# Patient Record
Sex: FEMALE | Race: OTHER | HISPANIC OR LATINO | Employment: FULL TIME | URBAN - METROPOLITAN AREA
[De-identification: names, ages, dates, MRNs, and addresses within clinical notes are randomized per-mention and may not be internally consistent; named-entity substitution may affect disease eponyms.]

---

## 2020-01-22 ENCOUNTER — OFFICE VISIT (OUTPATIENT)
Dept: GASTROENTEROLOGY | Facility: CLINIC | Age: 60
End: 2020-01-22
Payer: COMMERCIAL

## 2020-01-22 VITALS
DIASTOLIC BLOOD PRESSURE: 70 MMHG | WEIGHT: 152 LBS | SYSTOLIC BLOOD PRESSURE: 110 MMHG | BODY MASS INDEX: 27.97 KG/M2 | HEART RATE: 78 BPM | HEIGHT: 62 IN

## 2020-01-22 DIAGNOSIS — Z12.11 COLON CANCER SCREENING: ICD-10-CM

## 2020-01-22 DIAGNOSIS — K21.9 GASTROESOPHAGEAL REFLUX DISEASE, ESOPHAGITIS PRESENCE NOT SPECIFIED: Primary | ICD-10-CM

## 2020-01-22 DIAGNOSIS — Z12.11 SCREENING FOR COLON CANCER: Primary | ICD-10-CM

## 2020-01-22 DIAGNOSIS — J32.9 CHRONIC SINUSITIS, UNSPECIFIED LOCATION: ICD-10-CM

## 2020-01-22 DIAGNOSIS — R09.89 GLOBUS SENSATION: ICD-10-CM

## 2020-01-22 PROCEDURE — 99243 OFF/OP CNSLTJ NEW/EST LOW 30: CPT | Performed by: INTERNAL MEDICINE

## 2020-01-22 RX ORDER — LORAZEPAM 0.5 MG/1
0.5 TABLET ORAL DAILY PRN
COMMUNITY
Start: 2015-11-09

## 2020-01-22 RX ORDER — OMEPRAZOLE 40 MG/1
40 CAPSULE, DELAYED RELEASE ORAL DAILY
Qty: 30 CAPSULE | Refills: 5 | Status: SHIPPED | OUTPATIENT
Start: 2020-01-22 | End: 2020-07-30

## 2020-01-22 RX ORDER — FAMOTIDINE 40 MG/1
40 TABLET, FILM COATED ORAL DAILY
Qty: 30 TABLET | Refills: 5 | Status: SHIPPED | OUTPATIENT
Start: 2020-01-22 | End: 2020-10-26 | Stop reason: ALTCHOICE

## 2020-01-22 RX ORDER — DOCUSATE SODIUM 100 MG/1
100 CAPSULE, LIQUID FILLED ORAL DAILY
COMMUNITY

## 2020-01-22 RX ORDER — MULTIVITAMIN
1 TABLET ORAL DAILY
COMMUNITY

## 2020-01-22 RX ORDER — SIMVASTATIN 40 MG
40 TABLET ORAL DAILY
COMMUNITY
Start: 2015-10-12

## 2020-01-22 RX ORDER — PANTOPRAZOLE SODIUM 40 MG/1
40 TABLET, DELAYED RELEASE ORAL DAILY
COMMUNITY
Start: 2016-11-21 | End: 2020-01-22

## 2020-01-22 RX ORDER — DOXYCYCLINE 100 MG/1
100 TABLET ORAL 2 TIMES DAILY
COMMUNITY
Start: 2020-01-21 | End: 2020-02-04

## 2020-01-22 NOTE — PROGRESS NOTES
[id number] [facility] - Outpatient Consultation  Brandt Romeo 62 y o  female MRN: [id number]  Encounter: [id number]    ASSESSMENT AND PLAN:      1  Gastroesophageal reflux disease, esophagitis presence not specified  --Patient does have chronic issues with gastroesophageal reflux  May have had an EGD in the remote past   States she has a sour stomach and sometimes belching and regurgitation  Pantoprazole does not help that much  She did very well with Nexium in the past but it is not covered by her insurance  Will try omeprazole 40 mg daily  She could take this in the morning  Follow up with famotidine at bedtime    - omeprazole (PriLOSEC) 40 MG capsule; Take 1 capsule (40 mg total) by mouth daily  Dispense: 30 capsule; Refill: 5  - famotidine (PEPCID) 40 MG tablet; Take 1 tablet (40 mg total) by mouth daily  Dispense: 30 tablet; Refill: 5  - anti reflux diet  - head of the bed  At 30° at bedtime  - avoid late night snacks and do not eat dinner after 630 pm if possible    egd-at [facility]     2  Chronic sinusitis, unspecified location  - patient seeing ENT  Patient seems to improve her situation with co problems" scabs" when she is on doxycycline  Recent CT scan showed bilateral maxillary sinusitis and ethmoid sinusitis with a polyp or mucous retention cyst mild right septal deviation  - following up with Dr Vick Fontanez     3   Globus sensation  - probably related to problem 1     4  Colon cancer screening    -colonoscopy at [facility]  - patient at average risk for colon cancer      Followup Appointment:  3 mo   ______________________________________________________________________    Chief Complaint   Patient presents with    Reflux     sx worsening    Difficulty Swallowing     intermittent     Referred by Dr Vick Fontanez   For evaluation of GERD   HPI:   Brandt Romeo is a 62y o  year old female who  Was referred by her ear nose and throat physician for evaluation of gastroesophageal reflux  Patient has had chronic problems over the years with reflux  She complains of some sour stomach but often times she has issues with belching and a sensation that this some discomfort in the throat  Occasionally she  has some very mild dysphagia  Over the years she has been taking Nexium with fairly good results  Couple of years ago she had stopped having coverage with the Nexium and was switched over to pantoprazole  This is not nearly as effective  Patient does report she thinks she had an upper endoscopy and was told that she had some retained food in the stomach at that time  Patient reports over the last 6 months having a lot of problems with congestion and coughing  She will bring up "scabs" of material intermittently  She showed me her medication bottle and she was prescribed doxycycline  When she went off the doxycycline this problem got worse  When she had refilled and is on it now things are much improved  Patient does take her pantoprazole at bedtime  She does by some over-the-counter Nexium to take for her reflux when her pantoprazole just does not doing the trick  Patient wanted to get a colonoscopy done but the began having these issues ear nose and throat problems and had to postpone doing the colonoscopy  Her EENT ordered a CT [facility] sinuses at [facility]  Note was made of chronic ethmoid and maxillary sinusitis with a retention cyst   She did have a laryngoscopic examination in the past and was told that perhaps her vocal cords looked irritated      Historical Information   Past Medical History:   Diagnosis Date    Hyperlipidemia     Sinusitis      Past Surgical History:   Procedure Laterality Date    AUGMENTATION BREAST      BUNIONECTOMY      ECTOPIC PREGNANCY SURGERY      ROTATOR CUFF REPAIR       Social History     Substance and Sexual Activity   Alcohol Use Yes    Frequency: Monthly or less    Drinks per session: 1 or 2     Social History Substance and Sexual Activity   Drug Use Never     Social History     Tobacco Use   Smoking Status Former Smoker   Smokeless Tobacco Never Used     Family History   Problem Relation Age of Onset    Heart disease Mother     Cirrhosis Father     GI problems Sister     Colon polyps Neg Hx     Colon cancer Neg Hx        Meds/Allergies     Current Outpatient Medications:     docusate sodium (COLACE) 100 mg capsule    doxycycline (ADOXA) 100 MG tablet    LORazepam (ATIVAN) 0 5 mg tablet    Multiple Vitamin (MULTIVITAMIN) tablet    simvastatin (ZOCOR) 40 mg tablet    esomeprazole (NexIUM) 20 mg capsule    famotidine (PEPCID) 40 MG tablet    omeprazole (PriLOSEC) 40 MG capsule    No Known Allergies    PHYSICAL EXAM:    Blood pressure 110/70, pulse 78, height 5' 2" (1 575 m), weight 68 9 kg (152 lb)  Body mass index is 27 8 kg/m²  General Appearance: NAD, cooperative, alert  Eyes: Anicteric, PERRLA, EOMI  ENT:  Normocephalic, atraumatic, normal mucosa  Neck:  Supple, symmetrical, trachea midline,   Resp:  Clear to auscultation bilaterally; no rales, rhonchi or wheezing; respirations unlabored   CV:  S1 S2, Regular rate and rhythm; no murmur, rub, or gallop  GI:  Soft, non-tender, non-distended; normal bowel sounds; no masses, no organomegaly   Rectal: Deferred  Musculoskeletal: No cyanosis, clubbing or edema  Normal ROM  Skin:  No jaundice, rashes, or lesions   Heme/Lymph: No palpable cervical lymphadenopathy  Psych: Normal affect, good eye contact  Neuro: No gross deficits, AAOx3    Lab Results:   No results found for: WBC, HGB, HCT, MCV, PLT  No results found for: NA, K, CL, CO2, ANIONGAP, BUN, CREATININE, GLUCOSE, GLUF, CALCIUM, CORRECTEDCA, AST, ALT, ALKPHOS, PROT, BILITOT, EGFR  No results found for: IRON, TIBC, FERRITIN  No results found for: LIPASE    Radiology Results:   No results found  REVIEW OF SYSTEMS:    CONSTITUTIONAL: Denies any fever, chills, rigors, and weight loss    HEENT: No earache or tinnitus  Denies hearing loss or visual disturbances  CARDIOVASCULAR: No chest pain or palpitations  RESPIRATORY: Denies any cough, hemoptysis, shortness of breath or dyspnea on exertion  GASTROINTESTINAL: As noted in the History of Present Illness  GENITOURINARY: No problems with urination  Denies any hematuria or dysuria  NEUROLOGIC: No dizziness or vertigo, denies headaches  MUSCULOSKELETAL: Denies any muscle or joint pain  SKIN: Denies skin rashes or itching  ENDOCRINE: Denies excessive thirst  Denies intolerance to heat or cold  PSYCHOSOCIAL: Denies depression or anxiety  Denies any recent memory loss

## 2020-01-22 NOTE — PATIENT INSTRUCTIONS
[id number] [facility] - Outpatient Consultation  Zahra Peres 62 y o  female MRN: [id number]  Encounter: [id number]    ASSESSMENT AND PLAN:      1  Gastroesophageal reflux disease, esophagitis presence not specified  --Patient does have chronic issues with gastroesophageal reflux  May have had an EGD in the remote past   States she has a sour stomach and sometimes belching and regurgitation  Pantoprazole does not help that much  She did very well with Nexium in the past but it is not covered by her insurance  Will try omeprazole 40 mg daily  She could take this in the morning  Follow up with famotidine at bedtime    - omeprazole (PriLOSEC) 40 MG capsule; Take 1 capsule (40 mg total) by mouth daily  Dispense: 30 capsule; Refill: 5  - famotidine (PEPCID) 40 MG tablet; Take 1 tablet (40 mg total) by mouth daily  Dispense: 30 tablet; Refill: 5  - anti reflux diet  - head of the bed  At 30° at bedtime  - avoid late night snacks and do not eat dinner after 630 pm if possible    egd-at [facility]     2  Chronic sinusitis, unspecified location  - patient seeing ENT  Patient seems to improve her situation with co problems" scabs" when she is on doxycycline  Recent CT scan showed bilateral maxillary sinusitis and ethmoid sinusitis with a polyp or mucous retention cyst mild right septal deviation  - following up with Dr Karthik Humphries     3   Globus sensation  - probably related to problem 1     4  Colon cancer screening    -colonoscopy at [facility]  - patient at average risk for colon cancer      Followup Appointment:  3 mo

## 2020-01-22 NOTE — LETTER
January 22, 2020     Sharan Wright, [address] [place]    Patient: Olya Hogan   YOB: 1960   Date of Visit: 1/22/2020       Dear Dr Gomes Old:    Thank you for referring Honey Barker to me for evaluation  Below are my notes for this consultation  If you have questions, please do not hesitate to call me  I look forward to following your patient along with you  Sincerely,        Jacinto Adler MD        CC: Valorie Narvaez MD  1/22/2020  5:47 PM  Incomplete    [facility] - Outpatient Consultation  Olya Hogan 62 y o  female MRN: [id number]  Encounter: [id number]    ASSESSMENT AND PLAN:      1  Gastroesophageal reflux disease, esophagitis presence not specified  --Patient does have chronic issues with gastroesophageal reflux  May have had an EGD in the remote past   States she has a sour stomach and sometimes belching and regurgitation  Pantoprazole does not help that much  She did very well with Nexium in the past but it is not covered by her insurance  Will try omeprazole 40 mg daily  She could take this in the morning  Follow up with famotidine at bedtime    - omeprazole (PriLOSEC) 40 MG capsule; Take 1 capsule (40 mg total) by mouth daily  Dispense: 30 capsule; Refill: 5  - famotidine (PEPCID) 40 MG tablet; Take 1 tablet (40 mg total) by mouth daily  Dispense: 30 tablet; Refill: 5  - anti reflux diet  - head of the bed  At 30° at bedtime  - avoid late night snacks and do not eat dinner after 630 pm if possible    egd-at [facility]     2  Chronic sinusitis, unspecified location  - patient seeing ENT  Patient seems to improve her situation with co problems" scabs" when she is on doxycycline  Recent CT scan showed bilateral maxillary sinusitis and ethmoid sinusitis with a polyp or mucous retention cyst mild right septal deviation  - following up with Dr Sharan Wright     3   Globus sensation  - probably related to problem 1     4  Colon cancer screening    -colonoscopy at [facility]  - patient at average risk for colon cancer      Followup Appointment:  3 mo   ______________________________________________________________________    Chief Complaint   Patient presents with    Reflux     sx worsening    Difficulty Swallowing     intermittent     Referred by Dr Oliva Moseley   For evaluation of GERD   HPI:   Buck Kowalski is a 62y o  year old female who  Was referred by her ear nose and throat physician for evaluation of gastroesophageal reflux  Patient has had chronic problems over the years with reflux  She complains of some sour stomach but often times she has issues with belching and a sensation that this some discomfort in the throat  Occasionally she  has some very mild dysphagia  Over the years she has been taking Nexium with fairly good results  Couple of years ago she had stopped having coverage with the Nexium and was switched over to pantoprazole  This is not nearly as effective  Patient does report she thinks she had an upper endoscopy and was told that she had some retained food in the stomach at that time  Patient reports over the last 6 months having a lot of problems with congestion and coughing  She will bring up "scabs" of material intermittently  She showed me her medication bottle and she was prescribed doxycycline  When she went off the doxycycline this problem got worse  When she had refilled and is on it now things are much improved  Patient does take her pantoprazole at bedtime  She does by some over-the-counter Nexium to take for her reflux when her pantoprazole just does not doing the trick  Patient wanted to get a colonoscopy done but the began having these issues ear nose and throat problems and had to postpone doing the colonoscopy  Her EENT [place] [facility]    Note was made of chronic ethmoid and maxillary sinusitis with a retention cyst   She did have a laryngoscopic examination in the past and was told that perhaps her vocal cords looked irritated  Historical Information   Past Medical History:   Diagnosis Date    Hyperlipidemia     Sinusitis      Past Surgical History:   Procedure Laterality Date    AUGMENTATION BREAST      BUNIONECTOMY      ECTOPIC PREGNANCY SURGERY      ROTATOR CUFF REPAIR       Social History     Substance and Sexual Activity   Alcohol Use Yes    Frequency: Monthly or less    Drinks per session: 1 or 2     Social History     Substance and Sexual Activity   Drug Use Never     Social History     Tobacco Use   Smoking Status Former Smoker   Smokeless Tobacco Never Used     Family History   Problem Relation Age of Onset    Heart disease Mother     Cirrhosis Father     GI problems Sister     Colon polyps Neg Hx     Colon cancer Neg Hx        Meds/Allergies     Current Outpatient Medications:     docusate sodium (COLACE) 100 mg capsule    doxycycline (ADOXA) 100 MG tablet    LORazepam (ATIVAN) 0 5 mg tablet    Multiple Vitamin (MULTIVITAMIN) tablet    simvastatin (ZOCOR) 40 mg tablet    esomeprazole (NexIUM) 20 mg capsule    famotidine (PEPCID) 40 MG tablet    omeprazole (PriLOSEC) 40 MG capsule    No Known Allergies    PHYSICAL EXAM:    Blood pressure 110/70, pulse 78, height 5' 2" (1 575 m), weight 68 9 kg (152 lb)  Body mass index is 27 8 kg/m²  General Appearance: NAD, cooperative, alert  Eyes: Anicteric, PERRLA, EOMI  ENT:  Normocephalic, atraumatic, normal mucosa  Neck:  Supple, symmetrical, trachea midline,   Resp:  Clear to auscultation bilaterally; no rales, rhonchi or wheezing; respirations unlabored   CV:  S1 S2, Regular rate and rhythm; no murmur, rub, or gallop  GI:  Soft, non-tender, non-distended; normal bowel sounds; no masses, no organomegaly   Rectal: Deferred  Musculoskeletal: No cyanosis, clubbing or edema  Normal ROM    Skin:  No jaundice, rashes, or lesions   Heme/Lymph: No palpable cervical lymphadenopathy  Psych: Normal affect, good eye contact  Neuro: No gross deficits, AAOx3    Lab Results:   No results found for: WBC, HGB, HCT, MCV, PLT  No results found for: NA, K, CL, CO2, ANIONGAP, BUN, CREATININE, GLUCOSE, GLUF, CALCIUM, CORRECTEDCA, AST, ALT, ALKPHOS, PROT, BILITOT, EGFR  No results found for: IRON, TIBC, FERRITIN  No results found for: LIPASE    Radiology Results:   No results found  REVIEW OF SYSTEMS:    CONSTITUTIONAL: Denies any fever, chills, rigors, and weight loss  HEENT: No earache or tinnitus  Denies hearing loss or visual disturbances  CARDIOVASCULAR: No chest pain or palpitations  RESPIRATORY: Denies any cough, hemoptysis, shortness of breath or dyspnea on exertion  GASTROINTESTINAL: As noted in the History of Present Illness  GENITOURINARY: No problems with urination  Denies any hematuria or dysuria  NEUROLOGIC: No dizziness or vertigo, denies headaches  MUSCULOSKELETAL: Denies any muscle or joint pain  SKIN: Denies skin rashes or itching  ENDOCRINE: Denies excessive thirst  Denies intolerance to heat or cold  PSYCHOSOCIAL: Denies depression or anxiety  Denies any recent memory loss

## 2020-02-25 ENCOUNTER — TELEPHONE (OUTPATIENT)
Dept: GASTROENTEROLOGY | Facility: CLINIC | Age: 60
End: 2020-02-25

## 2020-02-26 DIAGNOSIS — K21.9 GASTROESOPHAGEAL REFLUX DISEASE, ESOPHAGITIS PRESENCE NOT SPECIFIED: Primary | ICD-10-CM

## 2020-02-26 NOTE — TELEPHONE ENCOUNTER
Pt had a procedure yesterday and was given a script for Nexium 40 mg daily #30 with 5 refills  This is for documentation only

## 2020-02-29 RX ORDER — ESOMEPRAZOLE MAGNESIUM 40 MG/1
40 CAPSULE, DELAYED RELEASE ORAL DAILY
Qty: 30 CAPSULE | Refills: 5
Start: 2020-02-29 | End: 2020-07-31 | Stop reason: SDUPTHER

## 2020-03-08 ENCOUNTER — TELEPHONE (OUTPATIENT)
Dept: GASTROENTEROLOGY | Facility: CLINIC | Age: 60
End: 2020-03-08

## 2020-03-08 NOTE — TELEPHONE ENCOUNTER
I called the patient and reviewed pathology  Patient has 2 hyperplastic polyps  Recall colonoscopy for 10 years  Patient does have some continued reflux symptoms  She does better with Nexium  Injuries tonight  She has well-formed  I told her to bring in the form and will fill it out an appeal that other meds have not helped her    Copy of path report and note to Dr Gaudencio Sen

## 2020-04-24 ENCOUNTER — TELEPHONE (OUTPATIENT)
Dept: GASTROENTEROLOGY | Facility: CLINIC | Age: 60
End: 2020-04-24

## 2020-07-30 ENCOUNTER — OFFICE VISIT (OUTPATIENT)
Dept: GASTROENTEROLOGY | Facility: CLINIC | Age: 60
End: 2020-07-30
Payer: COMMERCIAL

## 2020-07-30 VITALS
HEART RATE: 80 BPM | SYSTOLIC BLOOD PRESSURE: 122 MMHG | WEIGHT: 148 LBS | BODY MASS INDEX: 27.23 KG/M2 | HEIGHT: 62 IN | TEMPERATURE: 98.5 F | DIASTOLIC BLOOD PRESSURE: 62 MMHG

## 2020-07-30 DIAGNOSIS — K44.9 HIATAL HERNIA: ICD-10-CM

## 2020-07-30 DIAGNOSIS — Z12.11 COLON CANCER SCREENING: ICD-10-CM

## 2020-07-30 DIAGNOSIS — R07.0 THROAT DISCOMFORT: ICD-10-CM

## 2020-07-30 DIAGNOSIS — J32.9 CHRONIC SINUSITIS, UNSPECIFIED LOCATION: ICD-10-CM

## 2020-07-30 DIAGNOSIS — K21.9 GASTROESOPHAGEAL REFLUX DISEASE WITHOUT ESOPHAGITIS: Primary | ICD-10-CM

## 2020-07-30 DIAGNOSIS — K21.9 GASTROESOPHAGEAL REFLUX DISEASE, ESOPHAGITIS PRESENCE NOT SPECIFIED: ICD-10-CM

## 2020-07-30 PROCEDURE — 99214 OFFICE O/P EST MOD 30 MIN: CPT | Performed by: INTERNAL MEDICINE

## 2020-07-30 NOTE — PROGRESS NOTES
[address] [facility] - Outpatient Follow-up Note  Natalia Goldman 56 y o  female MRN: [id number]  Encounter: [id number]    ASSESSMENT AND PLAN:      1  Gastroesophageal reflux disease without esophagitis  -With chronic gastroesophageal reflux symptoms  She does well as long she takes Nexium  Unfortunately her insurance does not cover the Nexium  Patient reports she has had multiple PPIs in the past   We have tried prescription pantoprazole and prescription omeprazole 40 mg daily without good results  - have tried lansoprazole and rabeprazole as well  - patient reports that Nexium 40 mg is the only 1 that helps her  She has to pay out-of-pocket to get 2 over-the-counter 22 mg tablets which would be equivalent to the prescription dose    - will try to prior authorization use of this medication  - will be happy to discuss with patient's Health Plan representative  This would help improve patient's quality of life    2  Chronic sinusitis, unspecified location  -- patient does have issues with chronic sinus congestion  Feels as though things may be be dripping down from the back of her throat    3  Hiatal hernia  -- noted on recent upper endoscopy  She did not have significant esophagitis    4  Throat discomfort  -- patient reports her throat discomfort is independent of reflux symptoms  She has mucoid like collection which is small that she has to cough up every few days  She is taking photos of this  Has seen ENT in [facility] in the past but has not had resolution of this problem  - refer to Dr Citlali Perez   [facility]   - could consider manometry if ENT does not feel throat  problemsare related to an ENT issue  --    5  Colon cancer screening  -- negative colonoscopy for adenomatous polyps February 2020    Recall exam 2030      Followup Appointment:  6 months  ______________________________________________________________________    Chief Complaint   Patient presents with   Ardeth Needs Follow-up     HPI:    Patient returns to the office for follow-up visit with respect to gastroesophageal reflux symptoms  We did see her early in the year for these symptoms  Patient would have issues with significant heartburn  As long she takes Nexium she does fairly well  Upper endoscopy did not show esophagitis but did show a 3 cm hiatal hernia  Patient has to take over-the-counter Nexium 2 tablets a day for relief  She has had multiple courses of other proton pump inhibitors including pantoprazole, lansoprazole, omeprazole, and she believes rabeprazole without success  The only thing that really works is Nexium  Patient has had issues with throat discomfort ongoing  She has been seen by 2 ENT practices in the past   She has to cough up or had act up a mucoid like plug from her throat about every 3rd day  This is ongoing  She has frequent discomfort in the area  She does not feels related to the reflux  There is no history of asthma  The patient has had issues with sinusitis in the past and has had treatments with antibiotics  Patient denies shortness of breath or wheezing  She denies any other bowel issues  She is a little bit distraught and depressed over this problem      Historical Information   Past Medical History:   Diagnosis Date    GERD (gastroesophageal reflux disease)     Hyperlipidemia     Panic attacks     Sinusitis      Past Surgical History:   Procedure Laterality Date    AUGMENTATION BREAST      BUNIONECTOMY      ECTOPIC PREGNANCY SURGERY      ROTATOR CUFF REPAIR       Social History     Substance and Sexual Activity   Alcohol Use Yes    Frequency: Monthly or less    Drinks per session: 1 or 2     Social History     Substance and Sexual Activity   Drug Use Never     Social History     Tobacco Use   Smoking Status Former Smoker   Smokeless Tobacco Never Used     Family History   Problem Relation Age of Onset    Heart disease Mother     Cirrhosis Father     GI problems Sister     Colon polyps Neg Hx     Colon cancer Neg Hx          Current Outpatient Medications:     bisacodyl (DULCOLAX) 5 mg EC tablet    esomeprazole (NexIUM) 40 MG capsule    LORazepam (ATIVAN) 0 5 mg tablet    Multiple Vitamin (MULTIVITAMIN) tablet    simvastatin (ZOCOR) 40 mg tablet    docusate sodium (COLACE) 100 mg capsule    famotidine (PEPCID) 40 MG tablet  Allergies   Allergen Reactions    Codeine Other (See Comments)     Other reaction(s): Nausea and/or vomiting  upset stomach     Reviewed medications and allergies and updated as indicated    PHYSICAL EXAM:    Blood pressure 122/62, pulse 80, temperature 98 5 °F (36 9 °C), height 5' 2" (1 575 m), weight 67 1 kg (148 lb)  Body mass index is 27 07 kg/m²  General Appearance: NAD, cooperative, alert  Eyes: Anicteric,   Conjunctiva pink  ENT:  Normocephalic, atraumatic, normal mucosa  - did not see significant injection on exam  No Lympadenopathy   Neck:  Supple, symmetrical, trachea midline  Resp:  Clear to auscultation bilaterally; no rales, rhonchi or wheezing; respirations unlabored   CV:  S1 S2, Regular rate and rhythm; no murmur, rub, or gallop  GI:  Soft, non-tender, non-distended; normal bowel sounds; no masses, no organomegaly   Rectal: Deferred  Musculoskeletal: No cyanosis, clubbing or edema  Normal ROM    Skin:  No jaundice, rashes, or lesions   Heme/Lymph: No palpable cervical lymphadenopathy  Psych: Normal affect, good eye contact  Neuro: No gross deficits, AAOx3

## 2020-07-30 NOTE — LETTER
July 31, 2020     [address] [facility]  [address] [facility]    Patient: Natalia Goldman   YOB: 1960   Date of Visit: 7/30/2020       Dear Dr Donald Bridges: Thank you for referring Jordi Cuenca to me for evaluation  Below are my notes for this consultation  If you have questions, please do not hesitate to call me  I look forward to following your patient along with you  Sincerely,        Arturo Ontiveros MD        CC: MD Arturo Vaughn MD  7/31/2020  8:46 AM  Incomplete  [address] [facility] - Outpatient Follow-up Note  Natalia Goldman 56 y o  female MRN: [id number]  Encounter: [id number]    ASSESSMENT AND PLAN:      1  Gastroesophageal reflux disease without esophagitis  -With chronic gastroesophageal reflux symptoms  She does well as long she takes Nexium  Unfortunately her insurance does not cover the Nexium  Patient reports she has had multiple PPIs in the past   We have tried prescription pantoprazole and prescription omeprazole 40 mg daily without good results  - have tried lansoprazole and rabeprazole as well  - patient reports that Nexium 40 mg is the only 1 that helps her  She has to pay out-of-pocket to get 2 over-the-counter 22 mg tablets which would be equivalent to the prescription dose    - will try to prior authorization use of this medication  - will be happy to discuss with patient's Health Plan representative  This would help improve patient's quality of life    2  Chronic sinusitis, unspecified location  -- patient does have issues with chronic sinus congestion  Feels as though things may be be dripping down from the back of her throat    3  Hiatal hernia  -- noted on recent upper endoscopy  She did not have significant esophagitis    4  Throat discomfort  -- patient reports her throat discomfort is independent of reflux symptoms    She has mucoid like collection which is small that she has to cough up every few days  She is taking photos of this  Has seen ENT in [address] [facility] in the past but has not had resolution of this problem  - refer to Dr Earnestine Sierra   [facility]   - could consider manometry if ENT does not feel throat  problemsare related to an ENT issue  --    5  Colon cancer screening  -- negative colonoscopy for adenomatous polyps February 2020  Recall exam 2030      Followup Appointment:  6 months  ______________________________________________________________________    Chief Complaint   Patient presents with    Follow-up     HPI:    Patient returns to the office for follow-up visit with respect to gastroesophageal reflux symptoms  We did see her early in the year for these symptoms  Patient would have issues with significant heartburn  As long she takes Nexium she does fairly well  Upper endoscopy did not show esophagitis but did show a 3 cm hiatal hernia  Patient has to take over-the-counter Nexium 2 tablets a day for relief  She has had multiple courses of other proton pump inhibitors including pantoprazole, lansoprazole, omeprazole, and she believes rabeprazole without success  The only thing that really works is Nexium  Patient has had issues with throat discomfort ongoing  She has been seen by 2 ENT practices in the past   She has to cough up or had act up a mucoid like plug from her throat about every 3rd day  This is ongoing  She has frequent discomfort in the area  She does not feels related to the reflux  There is no history of asthma  The patient has had issues with sinusitis in the past and has had treatments with antibiotics  Patient denies shortness of breath or wheezing  She denies any other bowel issues  She is a little bit distraught and depressed over this problem      Historical Information   Past Medical History:   Diagnosis Date    GERD (gastroesophageal reflux disease)     Hyperlipidemia     Panic attacks     Sinusitis      Past Surgical History: Procedure Laterality Date    AUGMENTATION BREAST      BUNIONECTOMY      ECTOPIC PREGNANCY SURGERY      ROTATOR CUFF REPAIR       Social History     Substance and Sexual Activity   Alcohol Use Yes    Frequency: Monthly or less    Drinks per session: 1 or 2     Social History     Substance and Sexual Activity   Drug Use Never     Social History     Tobacco Use   Smoking Status Former Smoker   Smokeless Tobacco Never Used     Family History   Problem Relation Age of Onset    Heart disease Mother     Cirrhosis Father     GI problems Sister     Colon polyps Neg Hx     Colon cancer Neg Hx          Current Outpatient Medications:     bisacodyl (DULCOLAX) 5 mg EC tablet    esomeprazole (NexIUM) 40 MG capsule    LORazepam (ATIVAN) 0 5 mg tablet    Multiple Vitamin (MULTIVITAMIN) tablet    simvastatin (ZOCOR) 40 mg tablet    docusate sodium (COLACE) 100 mg capsule    famotidine (PEPCID) 40 MG tablet  Allergies   Allergen Reactions    Codeine Other (See Comments)     Other reaction(s): Nausea and/or vomiting  upset stomach     Reviewed medications and allergies and updated as indicated    PHYSICAL EXAM:    Blood pressure 122/62, pulse 80, temperature 98 5 °F (36 9 °C), height 5' 2" (1 575 m), weight 67 1 kg (148 lb)  Body mass index is 27 07 kg/m²  General Appearance: NAD, cooperative, alert  Eyes: Anicteric,   Conjunctiva pink  ENT:  Normocephalic, atraumatic, normal mucosa  - did not see significant injection on exam  No Lympadenopathy   Neck:  Supple, symmetrical, trachea midline  Resp:  Clear to auscultation bilaterally; no rales, rhonchi or wheezing; respirations unlabored   CV:  S1 S2, Regular rate and rhythm; no murmur, rub, or gallop  GI:  Soft, non-tender, non-distended; normal bowel sounds; no masses, no organomegaly   Rectal: Deferred  Musculoskeletal: No cyanosis, clubbing or edema  Normal ROM    Skin:  No jaundice, rashes, or lesions   Heme/Lymph: No palpable cervical lymphadenopathy  Psych: Normal affect, good eye contact  Neuro: No gross deficits, AAOx3        Aziza John MD  7/30/2020  4:03 PM  Sign at close encounter  [address] [facility] - Outpatient Follow-up Note  Naheed Rangel 56 y o  female MRN: [id number]  Encounter: [id number]    ASSESSMENT AND PLAN:      1  Gastroesophageal reflux disease without esophagitis  -With chronic gastroesophageal reflux symptoms  She does well as long she takes Nexium  Unfortunately her insurance does not cover the Nexium  Patient reports she has had multiple PPIs in the past   We have tried prescription pantoprazole and prescription omeprazole 40 mg daily without good results  - have tried lansoprazole and rabeprazole as well  - patient reports that Nexium 40 mg is the only 1 that helps her  She has to pay out-of-pocket to get 2 over-the-counter 22 mg tablets which would be equivalent to the prescription dose    - will try to prior authorization use of this medication  - will be happy to discuss with patient's Health Plan representative  This would help improve patient's quality of life    2  Chronic sinusitis, unspecified location  -- patient does have issues with chronic sinus congestion  Feels as though things may be be dripping down from the back of her throat    3  Hiatal hernia  -- noted on recent upper endoscopy  She did not have significant esophagitis    4  Throat discomfort  -- patient reports her throat discomfort is independent of reflux symptoms  She has mucoid like collection which is small that she has to cough up every few days  She is taking photos of this  Has seen ENT in [facility] in the past but has not had resolution of this problem  - refer to Dr Sharon Soto   [facility]   - could consider manometry if ENT does not feel throat  problemsare related to an ENT issue  --    5  Colon cancer screening  -- negative colonoscopy for adenomatous polyps February 2020    Recall exam 2030      Followup Appointment:  6 months  ______________________________________________________________________    Chief Complaint   Patient presents with    Follow-up     HPI:    Patient returns to the office for follow-up visit with respect to gastroesophageal reflux symptoms  We did see her early in the year for these symptoms  Patient would have issues with significant heartburn  As long she takes Nexium she does fairly well  Upper endoscopy did not show esophagitis but did show a 3 cm hiatal hernia  Patient has to take over-the-counter Nexium 2 tablets a day for relief  She has had multiple courses of other proton pump inhibitors including pantoprazole, lansoprazole, omeprazole, and she believes rabeprazole without success  The only thing that really works is Nexium  Patient has had issues with throat discomfort ongoing  She has been seen by 2 ENT practices in the past   She has to cough up or had act up a mucoid like plug from her throat about every 3rd day  This is ongoing  She has frequent discomfort in the area  She does not feels related to the reflux  There is no history of asthma  The patient has had issues with sinusitis in the past and has had treatments with antibiotics  Patient denies shortness of breath or wheezing  She denies any other bowel issues  She is a little bit distraught and depressed over this problem      Historical Information   Past Medical History:   Diagnosis Date    GERD (gastroesophageal reflux disease)     Hyperlipidemia     Panic attacks     Sinusitis      Past Surgical History:   Procedure Laterality Date    AUGMENTATION BREAST      BUNIONECTOMY      ECTOPIC PREGNANCY SURGERY      ROTATOR CUFF REPAIR       Social History     Substance and Sexual Activity   Alcohol Use Yes    Frequency: Monthly or less    Drinks per session: 1 or 2     Social History     Substance and Sexual Activity   Drug Use Never     Social History     Tobacco Use   Smoking Status Former Smoker   Smokeless Tobacco Never Used     Family History   Problem Relation Age of Onset    Heart disease Mother     Cirrhosis Father     GI problems Sister     Colon polyps Neg Hx     Colon cancer Neg Hx          Current Outpatient Medications:     bisacodyl (DULCOLAX) 5 mg EC tablet    esomeprazole (NexIUM) 40 MG capsule    LORazepam (ATIVAN) 0 5 mg tablet    Multiple Vitamin (MULTIVITAMIN) tablet    simvastatin (ZOCOR) 40 mg tablet    docusate sodium (COLACE) 100 mg capsule    famotidine (PEPCID) 40 MG tablet  Allergies   Allergen Reactions    Codeine Other (See Comments)     Other reaction(s): Nausea and/or vomiting  upset stomach     Reviewed medications and allergies and updated as indicated    PHYSICAL EXAM:    Blood pressure 122/62, pulse 80, temperature 98 5 °F (36 9 °C), height 5' 2" (1 575 m), weight 67 1 kg (148 lb)  Body mass index is 27 07 kg/m²  General Appearance: NAD, cooperative, alert  Eyes: Anicteric,   Conjunctiva pink  ENT:  Normocephalic, atraumatic, normal mucosa  - did not see significant injection on exam  Neck:  Supple, symmetrical, trachea midline  Resp:  Clear to auscultation bilaterally; no rales, rhonchi or wheezing; respirations unlabored   CV:  S1 S2, Regular rate and rhythm; no murmur, rub, or gallop  GI:  Soft, non-tender, non-distended; normal bowel sounds; no masses, no organomegaly   Rectal: Deferred  Musculoskeletal: No cyanosis, clubbing or edema  Normal ROM    Skin:  No jaundice, rashes, or lesions   Heme/Lymph: No palpable cervical lymphadenopathy  Psych: Normal affect, good eye contact  Neuro: No gross deficits, AAOx3

## 2020-07-30 NOTE — LETTER
July 30, 2020     [address] [facility]  [address] [facility]    Patient: Josiane Scott   YOB: 1960   Date of Visit: 7/30/2020       Dear Dr Alan Goel: Thank you for referring Kwesi Majano to me for evaluation  Below are my notes for this consultation  If you have questions, please do not hesitate to call me  I look forward to following your patient along with you  Sincerely,        Shayla Barros MD        CC: MD Shayla Carreno MD  7/30/2020  3:59 PM  Incomplete  [address] [facility] - Outpatient Follow-up Note  Josiane Scott 56 y o  female MRN: [id number]  Encounter: [id number]    ASSESSMENT AND PLAN:      1  Gastroesophageal reflux disease without esophagitis  -With chronic gastroesophageal reflux symptoms  She does well as long she takes Nexium  Unfortunately her insurance does not cover the Nexium  Patient reports she has had multiple PPIs in the past   We have tried prescription pantoprazole and prescription omeprazole 40 mg daily without good results  - have tried lansoprazole and rabeprazole as well  - patient reports that Nexium 40 mg is the only 1 that helps her  She has to pay out-of-pocket to get 2 over-the-counter 22 mg tablets which would be equivalent to the prescription dose    - will try to prior authorization use of this medication  - will be happy to discuss with patient's Health Plan representative  This would help improve patient's quality of life    2  Chronic sinusitis, unspecified location  -- patient does have issues with chronic sinus congestion  Feels as though things may be be dripping down from the back of her throat    3  Hiatal hernia  -- noted on recent upper endoscopy  She did not have significant esophagitis    4  Throat discomfort  -- patient reports her throat discomfort is independent of reflux symptoms    She has mucoid like collection which is small that she has to cough up every few days  She is taking photos of this  Has seen ENT in [facility] in the past but has not had resolution of this problem  - refer to Dr Cal Hoffman   [facility]   - could consider manometry if ENT does not feel throat  problemsare related to an ENT issue  --    5  Colon cancer screening  -- negative colonoscopy for adenomatous polyps February 2020  Recall exam 2030      Followup Appointment:  6 months  ______________________________________________________________________    Chief Complaint   Patient presents with    Follow-up     HPI:    Patient returns to the office for follow-up visit with respect to gastroesophageal reflux symptoms  We did see her early in the year for these symptoms  Patient would have issues with significant heartburn  As long she takes Nexium she does fairly well  Upper endoscopy did not show esophagitis but did show a 3 cm hiatal hernia  Patient has to take over-the-counter Nexium 2 tablets a day for relief  She has had multiple courses of other proton pump inhibitors including pantoprazole, lansoprazole, omeprazole, and she believes rabeprazole without success  The only thing that really works is Nexium  Patient has had issues with throat discomfort ongoing  She has been seen by 2 ENT practices in the past   She has to cough up or had act up a mucoid like plug from her throat about every 3rd day  This is ongoing  She has frequent discomfort in the area  She does not feels related to the reflux  There is no history of asthma  The patient has had issues with sinusitis in the past and has had treatments with antibiotics  Patient denies shortness of breath or wheezing  She denies any other bowel issues  She is a little bit distraught and depressed over this problem      Historical Information   Past Medical History:   Diagnosis Date    GERD (gastroesophageal reflux disease)     Hyperlipidemia     Panic attacks     Sinusitis      Past Surgical History: Procedure Laterality Date    AUGMENTATION BREAST      BUNIONECTOMY      ECTOPIC PREGNANCY SURGERY      ROTATOR CUFF REPAIR       Social History     Substance and Sexual Activity   Alcohol Use Yes    Frequency: Monthly or less    Drinks per session: 1 or 2     Social History     Substance and Sexual Activity   Drug Use Never     Social History     Tobacco Use   Smoking Status Former Smoker   Smokeless Tobacco Never Used     Family History   Problem Relation Age of Onset    Heart disease Mother     Cirrhosis Father     GI problems Sister     Colon polyps Neg Hx     Colon cancer Neg Hx          Current Outpatient Medications:     bisacodyl (DULCOLAX) 5 mg EC tablet    esomeprazole (NexIUM) 40 MG capsule    LORazepam (ATIVAN) 0 5 mg tablet    Multiple Vitamin (MULTIVITAMIN) tablet    simvastatin (ZOCOR) 40 mg tablet    docusate sodium (COLACE) 100 mg capsule    famotidine (PEPCID) 40 MG tablet  Allergies   Allergen Reactions    Codeine Other (See Comments)     Other reaction(s): Nausea and/or vomiting  upset stomach     Reviewed medications and allergies and updated as indicated    PHYSICAL EXAM:    Blood pressure 122/62, pulse 80, temperature 98 5 °F (36 9 °C), height 5' 2" (1 575 m), weight 67 1 kg (148 lb)  Body mass index is 27 07 kg/m²  General Appearance: NAD, cooperative, alert  Eyes: Anicteric,   Conjunctiva pink  ENT:  Normocephalic, atraumatic, normal mucosa  - did not see significant injection on exam  Neck:  Supple, symmetrical, trachea midline  Resp:  Clear to auscultation bilaterally; no rales, rhonchi or wheezing; respirations unlabored   CV:  S1 S2, Regular rate and rhythm; no murmur, rub, or gallop  GI:  Soft, non-tender, non-distended; normal bowel sounds; no masses, no organomegaly   Rectal: Deferred  Musculoskeletal: No cyanosis, clubbing or edema  Normal ROM    Skin:  No jaundice, rashes, or lesions   Heme/Lymph: No palpable cervical lymphadenopathy  Psych: Normal affect, good eye contact  Neuro: No gross deficits, AAOx3

## 2020-07-30 NOTE — PATIENT INSTRUCTIONS
[address] [facility] - Outpatient Follow-up Note  Nasrin Singh 56 y o  female MRN: [id number]  Encounter: [id number]    ASSESSMENT AND PLAN:      1  Gastroesophageal reflux disease without esophagitis  -With chronic gastroesophageal reflux symptoms  She does well as long she takes Nexium  Unfortunately her insurance does not cover the Nexium  Patient reports she has had multiple PPIs in the past   We have tried prescription pantoprazole and prescription omeprazole 40 mg daily without good results  - have tried lansoprazole and rabeprazole as well  - patient reports that Nexium 40 mg is the only 1 that helps her  She has to pay out-of-pocket to get 2 over-the-counter 22 mg tablets which would be equivalent to the prescription dose    - will try to prior authorization use of this medication  - will be happy to discuss with patient's Health Plan representative  This would help improve patient's quality of life    2  Chronic sinusitis, unspecified location  -- patient does have issues with chronic sinus congestion  Feels as though things may be be dripping down from the back of her throat    3  Hiatal hernia  -- noted on recent upper endoscopy  She did not have significant esophagitis    4  Throat discomfort  -- patient reports her throat discomfort is independent of reflux symptoms  She has mucoid like collection which is small that she has to cough up every few days  She is taking photos of this  Has seen ENT in [facility] in the past but has not had resolution of this problem  - refer to Dr Diane Copeland   [facility]   - could consider manometry if ENT does not feel throat  problemsare related to an ENT issue  --    5  Colon cancer screening  -- negative colonoscopy for adenomatous polyps February 2020    Recall exam 2030      Followup Appointment:  6 months

## 2020-07-31 RX ORDER — ESOMEPRAZOLE MAGNESIUM 40 MG/1
40 CAPSULE, DELAYED RELEASE ORAL DAILY
Qty: 365 CAPSULE | Refills: 0 | Status: SHIPPED | OUTPATIENT
Start: 2020-07-31

## 2020-07-31 NOTE — TELEPHONE ENCOUNTER
Patient's Rx plan denied coverage because it is available OTC, the esomeprazole is not a covered pharmacy benefit unther the pharmacy benefit of her plan based on the terms of her contract  I spoke with patient and reviewed denial  I did inform her of [facility] for coupon and also [facility]'s generic drug list program  They have esomeprazole 40 mg dfor $70 for 12 months  Patient provided with their phone number and she asked that we forward Rx  Gretta Garcia not available  Please sign off

## 2020-10-26 ENCOUNTER — OFFICE VISIT (OUTPATIENT)
Dept: PODIATRY | Facility: CLINIC | Age: 60
End: 2020-10-26
Payer: COMMERCIAL

## 2020-10-26 VITALS — BODY MASS INDEX: 27.6 KG/M2 | TEMPERATURE: 97.5 F | WEIGHT: 150 LBS | HEIGHT: 62 IN

## 2020-10-26 DIAGNOSIS — G62.9 POLYNEUROPATHY: ICD-10-CM

## 2020-10-26 DIAGNOSIS — G57.53 TARSAL TUNNEL SYNDROME, BILATERAL LOWER LIMBS: Primary | ICD-10-CM

## 2020-10-26 DIAGNOSIS — M79.2 NEUROGENIC PAIN OF FOOT, UNSPECIFIED LATERALITY: ICD-10-CM

## 2020-10-26 PROCEDURE — 99244 OFF/OP CNSLTJ NEW/EST MOD 40: CPT | Performed by: PODIATRIST

## 2020-10-26 RX ORDER — PANTOPRAZOLE SODIUM 40 MG/1
TABLET, DELAYED RELEASE ORAL
COMMUNITY
Start: 2020-10-24

## 2020-10-26 RX ORDER — GABAPENTIN 300 MG/1
CAPSULE ORAL
COMMUNITY
Start: 2020-10-07

## 2020-10-26 RX ORDER — DULOXETIN HYDROCHLORIDE 30 MG/1
30 CAPSULE, DELAYED RELEASE ORAL 2 TIMES DAILY
COMMUNITY
Start: 2020-08-06

## 2020-10-26 RX ORDER — CEFUROXIME AXETIL 500 MG/1
TABLET ORAL
COMMUNITY
Start: 2020-08-04 | End: 2020-10-26 | Stop reason: ALTCHOICE

## 2020-10-26 RX ORDER — PREDNISONE 10 MG/1
TABLET ORAL
COMMUNITY
Start: 2020-08-04 | End: 2020-10-26 | Stop reason: ALTCHOICE